# Patient Record
Sex: MALE | Race: WHITE | NOT HISPANIC OR LATINO
[De-identification: names, ages, dates, MRNs, and addresses within clinical notes are randomized per-mention and may not be internally consistent; named-entity substitution may affect disease eponyms.]

---

## 2022-04-26 PROBLEM — Z00.00 ENCOUNTER FOR PREVENTIVE HEALTH EXAMINATION: Status: ACTIVE | Noted: 2022-04-26

## 2022-04-28 ENCOUNTER — APPOINTMENT (OUTPATIENT)
Dept: PHYSICAL MEDICINE AND REHAB | Facility: CLINIC | Age: 32
End: 2022-04-28
Payer: COMMERCIAL

## 2022-04-28 VITALS
BODY MASS INDEX: 32.21 KG/M2 | SYSTOLIC BLOOD PRESSURE: 114 MMHG | DIASTOLIC BLOOD PRESSURE: 74 MMHG | TEMPERATURE: 97.1 F | HEIGHT: 70 IN | WEIGHT: 225 LBS

## 2022-04-28 PROCEDURE — 99203 OFFICE O/P NEW LOW 30 MIN: CPT

## 2022-05-12 ENCOUNTER — APPOINTMENT (OUTPATIENT)
Dept: INTERNAL MEDICINE | Facility: CLINIC | Age: 32
End: 2022-05-12
Payer: COMMERCIAL

## 2022-05-12 ENCOUNTER — NON-APPOINTMENT (OUTPATIENT)
Age: 32
End: 2022-05-12

## 2022-05-12 DIAGNOSIS — Z87.820 PERSONAL HISTORY OF TRAUMATIC BRAIN INJURY: ICD-10-CM

## 2022-05-12 PROCEDURE — 99204 OFFICE O/P NEW MOD 45 MIN: CPT | Mod: 95

## 2022-05-12 RX ORDER — ESCITALOPRAM OXALATE 5 MG/1
TABLET, FILM COATED ORAL
Refills: 0 | Status: ACTIVE | COMMUNITY

## 2022-06-02 ENCOUNTER — APPOINTMENT (OUTPATIENT)
Dept: PHYSICAL MEDICINE AND REHAB | Facility: CLINIC | Age: 32
End: 2022-06-02
Payer: COMMERCIAL

## 2022-06-02 VITALS
HEIGHT: 70 IN | WEIGHT: 225 LBS | TEMPERATURE: 98.4 F | BODY MASS INDEX: 32.21 KG/M2 | DIASTOLIC BLOOD PRESSURE: 77 MMHG | SYSTOLIC BLOOD PRESSURE: 117 MMHG | HEART RATE: 84 BPM

## 2022-06-02 PROCEDURE — 99213 OFFICE O/P EST LOW 20 MIN: CPT
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
## 2022-06-02 NOTE — PHYSICAL EXAM
[Normal] : Awake and interactive. [de-identified] : No focal deficits, Language intact, No sensory deficits, Coordination intact

## 2022-06-02 NOTE — CARE PLAN
[Ongoing physical and mental activity is recommended to maintain overall health. Reintegration into] : Ongoing physical and mental activity is recommended to maintain overall health. Reintegration into activities as tolerated while monitoring the symptoms was recommended [Sleep Hygiene Education] : Sleep hygiene recommendations made. [Headache management] : Recommended therapeutic intervention for headaches. [Ice] : Recommended ice to sore areas as needed.  [Heat] : Recommended warm compress to sore area as needed. [Return to Physical Activity] : Return to physical activity was recommended [Neuropsychologist for intake and cognitive evaluation] : Neuropsychology for intake and cognitive evaluation [Psychiatry for medication management of behavioral/psychiatric symptoms] : Psychiatry for complex management of behavioral and mood [Follow up appointment recommended ___] : Follow up appointment recommended [unfilled] [Answered all questions and was pleased with information provided. Understanding demonstrated.] : Answered all questions and was pleased with information provided. Understanding demonstrated.  [FreeTextEntry7] : Continue chiropractor as needed.

## 2022-07-07 ENCOUNTER — APPOINTMENT (OUTPATIENT)
Dept: PHYSICAL MEDICINE AND REHAB | Facility: CLINIC | Age: 32
End: 2022-07-07

## 2022-07-07 VITALS
RESPIRATION RATE: 12 BRPM | SYSTOLIC BLOOD PRESSURE: 116 MMHG | HEIGHT: 70 IN | DIASTOLIC BLOOD PRESSURE: 76 MMHG | HEART RATE: 92 BPM | BODY MASS INDEX: 31.5 KG/M2 | WEIGHT: 220 LBS

## 2022-07-07 DIAGNOSIS — G44.319 ACUTE POST-TRAUMATIC HEADACHE, NOT INTRACTABLE: ICD-10-CM

## 2022-07-07 DIAGNOSIS — U07.1 COVID-19: ICD-10-CM

## 2022-07-07 DIAGNOSIS — Z87.898 PERSONAL HISTORY OF OTHER SPECIFIED CONDITIONS: ICD-10-CM

## 2022-07-07 DIAGNOSIS — S09.90XA UNSPECIFIED INJURY OF HEAD, INITIAL ENCOUNTER: ICD-10-CM

## 2022-07-07 PROCEDURE — 99214 OFFICE O/P EST MOD 30 MIN: CPT
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
## 2022-07-07 NOTE — CARE PLAN
[Ongoing physical and mental activity is recommended to maintain overall health. Reintegration into] : Ongoing physical and mental activity is recommended to maintain overall health. Reintegration into activities as tolerated while monitoring the symptoms was recommended [Sleep Hygiene Education] : Sleep hygiene recommendations made. [Headache management] : Recommended therapeutic intervention for headaches. [Return to Physical Activity] : Return to physical activity was recommended [Neuropsychologist for intake and cognitive evaluation] : Neuropsychology for intake and cognitive evaluation [Psychiatry for medication management of behavioral/psychiatric symptoms] : Psychiatry for complex management of behavioral and mood [Counseling for mood impairments, biofeedback and/or CBT] : Counseling for mood, biofeedback, CBT, etc. [Follow up appointment recommended ___] : Follow up appointment recommended [unfilled] [Answered all questions and was pleased with information provided. Understanding demonstrated.] : Answered all questions and was pleased with information provided. Understanding demonstrated.

## 2022-07-07 NOTE — QUALITY MEASURES
[Anxiety/depression] : Anxiety/depression: Yes [Headaches] : Headaches: Yes [Sleep disorders] : Sleep disorders: Yes [Educated about return to activity] : Educated about return to activity: Yes [Educated about return to work] : Educated about return to work: Yes [Educated about reinjury and minimizing risk] : Educated about reinjury and minimizing risk: Yes  [Educated about sleep hygiene] : Educated about sleep hygiene: Yes  [Educated about conservative pain management options] : Educated about conservative pain management options: Yes

## 2022-07-07 NOTE — PHYSICAL EXAM
[Normal] : Awake and interactive. [de-identified] : No focal deficits, Language intact, No sensory deficits, Coordination intact

## 2022-07-07 NOTE — REASON FOR VISIT
[Follow-up Evaluation] : a follow-up evaluation [Headache] : a headache [Cognitive Complaints] : cognitive complaints

## 2022-07-21 ENCOUNTER — TRANSCRIPTION ENCOUNTER (OUTPATIENT)
Age: 32
End: 2022-07-21

## 2022-09-01 ENCOUNTER — APPOINTMENT (OUTPATIENT)
Dept: PHYSICAL MEDICINE AND REHAB | Facility: CLINIC | Age: 32
End: 2022-09-01

## 2022-09-01 DIAGNOSIS — Z78.9 OTHER SPECIFIED HEALTH STATUS: ICD-10-CM

## 2022-09-01 DIAGNOSIS — F32.A ANXIETY DISORDER, UNSPECIFIED: ICD-10-CM

## 2022-09-01 DIAGNOSIS — F40.298 OTHER SPECIFIED PHOBIA: ICD-10-CM

## 2022-09-01 DIAGNOSIS — F41.9 ANXIETY DISORDER, UNSPECIFIED: ICD-10-CM

## 2022-09-01 DIAGNOSIS — R53.83 OTHER FATIGUE: ICD-10-CM

## 2022-09-01 DIAGNOSIS — M54.2 CERVICALGIA: ICD-10-CM

## 2022-09-01 PROCEDURE — 99213 OFFICE O/P EST LOW 20 MIN: CPT

## 2022-09-01 NOTE — CARE PLAN
[Ongoing physical and mental activity is recommended to maintain overall health. Reintegration into] : Ongoing physical and mental activity is recommended to maintain overall health. Reintegration into activities as tolerated while monitoring the symptoms was recommended [Follow up appointment recommended ___] : Follow up appointment recommended [unfilled] [Answered all questions and was pleased with information provided. Understanding demonstrated.] : Answered all questions and was pleased with information provided. Understanding demonstrated.
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
## 2022-09-01 NOTE — PHYSICAL EXAM
[Normal] : Patient has a normal gait. [de-identified] : No focal deficits, Language intact, No sensory deficits, Coordination intact

## 2023-02-05 ENCOUNTER — NON-APPOINTMENT (OUTPATIENT)
Age: 33
End: 2023-02-05

## 2023-06-05 ENCOUNTER — APPOINTMENT (OUTPATIENT)
Dept: PULMONOLOGY | Facility: CLINIC | Age: 33
End: 2023-06-05
Payer: COMMERCIAL

## 2023-06-05 VITALS
SYSTOLIC BLOOD PRESSURE: 122 MMHG | DIASTOLIC BLOOD PRESSURE: 80 MMHG | WEIGHT: 230 LBS | HEIGHT: 70 IN | RESPIRATION RATE: 16 BRPM | OXYGEN SATURATION: 97 % | BODY MASS INDEX: 32.93 KG/M2 | HEART RATE: 66 BPM

## 2023-06-05 DIAGNOSIS — Z80.8 FAMILY HISTORY OF MALIGNANT NEOPLASM OF OTHER ORGANS OR SYSTEMS: ICD-10-CM

## 2023-06-05 DIAGNOSIS — Z87.828 PERSONAL HISTORY OF OTHER (HEALED) PHYSICAL INJURY AND TRAUMA: ICD-10-CM

## 2023-06-05 DIAGNOSIS — K21.9 GASTRO-ESOPHAGEAL REFLUX DISEASE W/OUT ESOPHAGITIS: ICD-10-CM

## 2023-06-05 DIAGNOSIS — Z83.3 FAMILY HISTORY OF DIABETES MELLITUS: ICD-10-CM

## 2023-06-05 PROCEDURE — 99204 OFFICE O/P NEW MOD 45 MIN: CPT

## 2023-06-05 RX ORDER — NIRMATRELVIR AND RITONAVIR 300-100 MG
20 X 150 MG & KIT ORAL
Qty: 1 | Refills: 0 | Status: DISCONTINUED | COMMUNITY
Start: 2022-05-12 | End: 2023-06-05

## 2023-06-05 RX ORDER — FLUTICASONE PROPIONATE 50 MCG
50 SPRAY, SUSPENSION NASAL
Refills: 0 | Status: DISCONTINUED | COMMUNITY
End: 2023-06-05

## 2023-06-05 NOTE — DISCUSSION/SUMMARY
[Obstructive Sleep Apnea] : obstructive sleep apnea [Sleep Study] : sleep study [Alcohol Avoidance] : alcohol avoidance [Sedative Avoidance] : sedative avoidance [Weight Loss Program] : weight loss program [de-identified] : HST [de-identified] : The pathophysiology of sleep was explained to the patient in detail. Inclusive of this was the reasoning behind and the expected response to positive airway pressure therapy. Compliance was outlined including further followup

## 2023-06-05 NOTE — HISTORY OF PRESENT ILLNESS
[Obstructive Sleep Apnea] : obstructive sleep apnea [Awakes Unrefreshed] : awakes unrefreshed [Awakes with Dry Mouth] : awakes with dry mouth [Daytime Somnolence] : daytime somnolence [Snoring] : snoring [Awakes with Headache] : does not awaken with headache [TextBox_77] : 1 [TextBox_81] : 60 [TextBox_85] : 6-8 [TextBox_89] : 2 [TextBox_93] : erratic sleep schedule, uses melatonin [ESS] : 15

## 2023-06-05 NOTE — CONSULT LETTER
[Dear  ___] : Dear  [unfilled], [Consult Letter:] : I had the pleasure of evaluating your patient, [unfilled]. [Please see my note below.] : Please see my note below. [Consult Closing:] : Thank you very much for allowing me to participate in the care of this patient.  If you have any questions, please do not hesitate to contact me. [Sincerely,] : Sincerely, [FreeTextEntry3] : Channing Reddy MD FCCP\par Pulmonary/Critical Care/Sleep Medicine\par Department of Internal Medicine\par \par [facility] [employer]\par

## 2023-06-12 ENCOUNTER — OUTPATIENT (OUTPATIENT)
Dept: OUTPATIENT SERVICES | Facility: HOSPITAL | Age: 33
LOS: 1 days | End: 2023-06-12
Payer: COMMERCIAL

## 2023-06-12 DIAGNOSIS — G47.33 OBSTRUCTIVE SLEEP APNEA (ADULT) (PEDIATRIC): ICD-10-CM

## 2023-06-12 PROCEDURE — 95800 SLP STDY UNATTENDED: CPT

## 2023-06-29 ENCOUNTER — APPOINTMENT (OUTPATIENT)
Dept: PULMONOLOGY | Facility: CLINIC | Age: 33
End: 2023-06-29
Payer: COMMERCIAL

## 2023-06-29 VITALS
BODY MASS INDEX: 34.51 KG/M2 | SYSTOLIC BLOOD PRESSURE: 122 MMHG | WEIGHT: 241.04 LBS | HEIGHT: 70 IN | DIASTOLIC BLOOD PRESSURE: 70 MMHG | RESPIRATION RATE: 16 BRPM

## 2023-06-29 VITALS — HEART RATE: 76 BPM | OXYGEN SATURATION: 95 %

## 2023-06-29 DIAGNOSIS — G47.9 SLEEP DISORDER, UNSPECIFIED: ICD-10-CM

## 2023-06-29 PROCEDURE — 99213 OFFICE O/P EST LOW 20 MIN: CPT

## 2023-06-29 NOTE — DISCUSSION/SUMMARY
[Obstructive Sleep Apnea] : obstructive sleep apnea [Sleep Study] : sleep study [Alcohol Avoidance] : alcohol avoidance [Sedative Avoidance] : sedative avoidance [Weight Loss Program] : weight loss program [de-identified] : HST [de-identified] : The pathophysiology of sleep was explained to the patient in detail. Inclusive of this was the reasoning behind and the expected response to positive airway pressure therapy. Compliance was outlined including further followup [FreeTextEntry1] : Study consistent with very mild obstructive sleep apnea.  Most likely sleep disturbance secondary to postconcussion syndrome.  Patient has been advised on proper sleep hygiene and encouraged that this will resolve.  Melatonin may also be a consideration for sleep initiation.  Patient warned against the risk of increased weight especially in light of his mild findings on sleep study.  He will follow-up on a as needed basis

## 2023-06-29 NOTE — CONSULT LETTER
[Dear  ___] : Dear  [unfilled], [Consult Letter:] : I had the pleasure of evaluating your patient, [unfilled]. [Please see my note below.] : Please see my note below. [Consult Closing:] : Thank you very much for allowing me to participate in the care of this patient.  If you have any questions, please do not hesitate to contact me. [Sincerely,] : Sincerely, [FreeTextEntry3] : Channing Reddy MD FCCP\par Pulmonary/Critical Care/Sleep Medicine\par Department of Internal Medicine\par \par [facility] [employer] [facility]\par

## 2023-06-29 NOTE — HISTORY OF PRESENT ILLNESS
[Obstructive Sleep Apnea] : obstructive sleep apnea [Awakes Unrefreshed] : awakes unrefreshed [Awakes with Dry Mouth] : awakes with dry mouth [Daytime Somnolence] : daytime somnolence [Snoring] : snoring [Home] : home [TextBox_4] : s/p concussion 1 yr ago [Awakes with Headache] : does not awaken with headache [TextBox_81] : 60 [TextBox_85] : 6-8 [TextBox_89] : 2 [TextBox_93] : erratic sleep schedule, uses melatonin [TextBox_100] : 6/12/2023 [TextBox_108] : 6.0 [TextBox_120] : REM index 16.3 [TextBox_165] : Feeling better, brain fog improved [ESS] : 15

## 2023-11-15 ENCOUNTER — EMERGENCY (EMERGENCY)
Facility: HOSPITAL | Age: 33
LOS: 1 days | Discharge: HOME | End: 2023-11-15
Attending: STUDENT IN AN ORGANIZED HEALTH CARE EDUCATION/TRAINING PROGRAM
Payer: COMMERCIAL

## 2023-11-15 VITALS
SYSTOLIC BLOOD PRESSURE: 128 MMHG | HEART RATE: 65 BPM | OXYGEN SATURATION: 98 % | TEMPERATURE: 98 F | HEIGHT: 70 IN | WEIGHT: 226.64 LBS | DIASTOLIC BLOOD PRESSURE: 81 MMHG | RESPIRATION RATE: 18 BRPM

## 2023-11-15 PROCEDURE — 99284 EMERGENCY DEPT VISIT MOD MDM: CPT

## 2023-11-15 PROCEDURE — 36415 COLL VENOUS BLD VENIPUNCTURE: CPT

## 2023-11-15 PROCEDURE — 70450 CT HEAD/BRAIN W/O DYE: CPT | Mod: 26,MG

## 2023-11-15 PROCEDURE — 86618 LYME DISEASE ANTIBODY: CPT

## 2023-11-15 PROCEDURE — 99284 EMERGENCY DEPT VISIT MOD MDM: CPT | Mod: 25

## 2023-11-15 PROCEDURE — G1004: CPT

## 2023-11-15 PROCEDURE — 70450 CT HEAD/BRAIN W/O DYE: CPT | Mod: MG

## 2023-11-15 PROCEDURE — 82962 GLUCOSE BLOOD TEST: CPT

## 2023-11-15 RX ORDER — VALACYCLOVIR 500 MG/1
1 TABLET, FILM COATED ORAL
Qty: 21 | Refills: 0
Start: 2023-11-15 | End: 2023-11-21

## 2023-11-15 RX ORDER — VALACYCLOVIR 500 MG/1
1000 TABLET, FILM COATED ORAL ONCE
Refills: 0 | Status: DISCONTINUED | OUTPATIENT
Start: 2023-11-15 | End: 2023-11-22

## 2023-11-15 NOTE — ED PROVIDER NOTE - CLINICAL SUMMARY MEDICAL DECISION MAKING FREE TEXT BOX
ct head reviewed and neg.  symptoms c/w bell's palsy. will start steroids and valtrex.  instructed to f/up with neuro. no definite tick exposure but will check lyme titer. given return and f/up instructions.  Pt given a copy of all results and instructed to f/up with pcp regarding any abnormal results.

## 2023-11-15 NOTE — ED ADULT NURSE NOTE - NSFALLUNIVINTERV_ED_ALL_ED
Bed/Stretcher in lowest position, wheels locked, appropriate side rails in place/Call bell, personal items and telephone in reach/Instruct patient to call for assistance before getting out of bed/chair/stretcher/Non-slip footwear applied when patient is off stretcher/[place] to call system/Physically safe environment - no spills, clutter or unnecessary equipment/Purposeful proactive rounding/Room/bathroom lighting operational, light cord in reach

## 2023-11-15 NOTE — ED PROVIDER NOTE - PATIENT PORTAL LINK FT
You can access the FollowMyHealth Patient Portal offered by [facility] by registering at the following website: [email or web address]. By joining [employer]’s FollowMyHealth portal, you will also be able to view your health information using other applications (apps) compatible with our system.

## 2023-11-15 NOTE — ED ADULT NURSE NOTE - OBJECTIVE STATEMENT
pt is here with  c/o right sided headache on Sunday  and last night at 2300, pt c/o right sided facial numbness. and difficulty closing his R eye.  Pt is a/ox4, skin W&D, color WNL, speech clear. Labs sent.  Pt medicated as ordered and dc'd to home.

## 2023-11-15 NOTE — ED PROVIDER NOTE - OBJECTIVE STATEMENT
Pt is a 32 yo M co R facial droop.  Pt states that 4 d ago he started with R ear pain that progressed to a R-sided headache. Pt states that over the next three days he noticed that the R corner of his mouth was drooping and he was unable to completely close his R eye and his R eye was becoming dry. Pt states that he feels like it was worse this morning so he came to the ER. no other numbness/weakness. no other complaints.

## 2023-11-15 NOTE — ED PROVIDER NOTE - PHYSICAL EXAMINATION
Constitutional - well-developed.   Head - NCAT. Airway patent.   Eyes - PERRL.   CV - RRR. no murmur. no edema.   Pulm - CTAB.   Abd - soft, nt. no rebound. no guarding.   Neuro - A&Ox3. strength 5/5 x4. sensation intact x4. normal gait. R facial droop that includes the forehead. unable to close R eye completely  Skin - No rash. .  MSK - normal ROM.

## 2023-11-15 NOTE — ED PROVIDER NOTE - CARE PROVIDER_API CALL
Blair Michel  Neurology  [address] [place] [address], [facility] [address]  [place] [address]  Phone: [phone]  Fax: [phone]  Follow Up Time: 1-3 Days

## 2023-11-16 LAB
B BURGDOR C6 AB SER-ACNC: NEGATIVE — SIGNIFICANT CHANGE UP
B BURGDOR C6 AB SER-ACNC: NEGATIVE — SIGNIFICANT CHANGE UP
B BURGDOR IGG+IGM SER-ACNC: 0.05 INDEX — SIGNIFICANT CHANGE UP (ref 0.01–0.89)
B BURGDOR IGG+IGM SER-ACNC: 0.05 INDEX — SIGNIFICANT CHANGE UP (ref 0.01–0.89)

## 2024-03-23 NOTE — QUALITY MEASURES
No [Anxiety/depression] : Anxiety/depression: Yes [Headaches] : Headaches: Yes [Sleep disorders] : Sleep disorders: Yes [Educated about return to activity] : Educated about return to activity: Yes [Educated about return to school] : Educated about return to school: Not Applicable [Educated about return to sports] : Educated about return to sports: Not Applicable [Educated about return to work] : Educated about return to work: Not Applicable [Educated about reinjury and minimizing risk] : Educated about reinjury and minimizing risk: Yes  [Educated about sleep hygiene] : Educated about sleep hygiene: Yes  [Educated about conservative pain management options] : Educated about conservative pain management options: Yes

## 2024-05-27 ENCOUNTER — NON-APPOINTMENT (OUTPATIENT)
Age: 34
End: 2024-05-27

## 2024-09-05 ENCOUNTER — NON-APPOINTMENT (OUTPATIENT)
Age: 34
End: 2024-09-05